# Patient Record
Sex: FEMALE
[De-identification: names, ages, dates, MRNs, and addresses within clinical notes are randomized per-mention and may not be internally consistent; named-entity substitution may affect disease eponyms.]

---

## 2017-04-21 ENCOUNTER — TRANSCRIPTION ENCOUNTER (OUTPATIENT)
Age: 37
End: 2017-04-21

## 2018-02-15 ENCOUNTER — APPOINTMENT (OUTPATIENT)
Dept: PULMONOLOGY | Facility: CLINIC | Age: 38
End: 2018-02-15
Payer: COMMERCIAL

## 2018-02-15 ENCOUNTER — APPOINTMENT (OUTPATIENT)
Dept: PULMONOLOGY | Facility: CLINIC | Age: 38
End: 2018-02-15

## 2018-02-15 VITALS
HEART RATE: 88 BPM | WEIGHT: 148 LBS | DIASTOLIC BLOOD PRESSURE: 72 MMHG | OXYGEN SATURATION: 98 % | RESPIRATION RATE: 17 BRPM | BODY MASS INDEX: 23.78 KG/M2 | SYSTOLIC BLOOD PRESSURE: 100 MMHG | HEIGHT: 66 IN

## 2018-02-15 DIAGNOSIS — J06.9 ACUTE UPPER RESPIRATORY INFECTION, UNSPECIFIED: ICD-10-CM

## 2018-02-15 PROCEDURE — 71046 X-RAY EXAM CHEST 2 VIEWS: CPT

## 2018-02-15 PROCEDURE — 99204 OFFICE O/P NEW MOD 45 MIN: CPT | Mod: 25

## 2018-02-15 PROCEDURE — 94640 AIRWAY INHALATION TREATMENT: CPT

## 2018-02-15 PROCEDURE — 96372 THER/PROPH/DIAG INJ SC/IM: CPT

## 2018-02-15 RX ORDER — METHYLPRED ACET/NACL,ISO-OS/PF 40 MG/ML
40 VIAL (ML) INJECTION
Qty: 1 | Refills: 0 | Status: COMPLETED | OUTPATIENT
Start: 2018-02-15

## 2018-02-15 RX ADMIN — METHYLPREDNISOLONE ACETATE 0 MG/ML: 40 INJECTION, SUSPENSION INTRA-ARTICULAR; INTRALESIONAL; INTRAMUSCULAR; SOFT TISSUE at 00:00

## 2018-02-19 ENCOUNTER — APPOINTMENT (OUTPATIENT)
Dept: PULMONOLOGY | Facility: CLINIC | Age: 38
End: 2018-02-19
Payer: COMMERCIAL

## 2018-02-19 VITALS
BODY MASS INDEX: 23.78 KG/M2 | HEIGHT: 66 IN | DIASTOLIC BLOOD PRESSURE: 70 MMHG | RESPIRATION RATE: 17 BRPM | OXYGEN SATURATION: 99 % | SYSTOLIC BLOOD PRESSURE: 106 MMHG | WEIGHT: 148 LBS | HEART RATE: 82 BPM

## 2018-02-19 DIAGNOSIS — J98.01 ACUTE BRONCHOSPASM: ICD-10-CM

## 2018-02-19 PROCEDURE — 99214 OFFICE O/P EST MOD 30 MIN: CPT

## 2018-02-19 RX ORDER — ALBUTEROL SULFATE 90 UG/1
108 (90 BASE) AEROSOL, METERED RESPIRATORY (INHALATION)
Qty: 1 | Refills: 1 | Status: ACTIVE | OUTPATIENT
Start: 2018-02-19

## 2018-02-27 ENCOUNTER — APPOINTMENT (OUTPATIENT)
Dept: PULMONOLOGY | Facility: CLINIC | Age: 38
End: 2018-02-27
Payer: COMMERCIAL

## 2018-02-27 VITALS
OXYGEN SATURATION: 98 % | DIASTOLIC BLOOD PRESSURE: 80 MMHG | BODY MASS INDEX: 23.78 KG/M2 | WEIGHT: 148 LBS | HEART RATE: 80 BPM | HEIGHT: 66 IN | SYSTOLIC BLOOD PRESSURE: 124 MMHG

## 2018-02-27 DIAGNOSIS — J45.20 MILD INTERMITTENT ASTHMA, UNCOMPLICATED: ICD-10-CM

## 2018-02-27 PROCEDURE — 94729 DIFFUSING CAPACITY: CPT

## 2018-02-27 PROCEDURE — 94727 GAS DIL/WSHOT DETER LNG VOL: CPT

## 2018-02-27 PROCEDURE — 94060 EVALUATION OF WHEEZING: CPT

## 2018-02-27 PROCEDURE — 99214 OFFICE O/P EST MOD 30 MIN: CPT | Mod: 25

## 2018-02-27 RX ORDER — IPRATROPIUM BROMIDE AND ALBUTEROL SULFATE 2.5; .5 MG/3ML; MG/3ML
0.5-2.5 (3) SOLUTION RESPIRATORY (INHALATION)
Qty: 1 | Refills: 1 | Status: DISCONTINUED | COMMUNITY
Start: 2018-02-15 | End: 2018-02-27

## 2018-02-27 RX ORDER — PREDNISONE 20 MG/1
20 TABLET ORAL DAILY
Qty: 14 | Refills: 0 | Status: COMPLETED | COMMUNITY
Start: 2018-02-15 | End: 2018-02-27

## 2018-02-27 RX ORDER — BUDESONIDE 0.5 MG/2ML
0.5 INHALANT ORAL
Qty: 1 | Refills: 2 | Status: DISCONTINUED | COMMUNITY
Start: 2018-02-15 | End: 2018-02-27

## 2018-02-27 RX ORDER — ALBUTEROL SULFATE 90 UG/1
108 (90 BASE) AEROSOL, METERED RESPIRATORY (INHALATION)
Qty: 1 | Refills: 1 | Status: ACTIVE | COMMUNITY
Start: 2018-02-27

## 2018-02-27 RX ORDER — FLUTICASONE FUROATE AND VILANTEROL TRIFENATATE 100; 25 UG/1; UG/1
100-25 POWDER RESPIRATORY (INHALATION) DAILY
Qty: 1 | Refills: 0 | Status: DISCONTINUED | OUTPATIENT
Start: 2018-02-19 | End: 2018-02-27

## 2018-03-29 ENCOUNTER — TRANSCRIPTION ENCOUNTER (OUTPATIENT)
Age: 38
End: 2018-03-29

## 2020-12-16 PROBLEM — J06.9 UPPER RESPIRATORY INFECTION, ACUTE: Status: RESOLVED | Noted: 2018-02-15 | Resolved: 2020-12-16

## 2024-03-18 ENCOUNTER — APPOINTMENT (OUTPATIENT)
Dept: OBGYN | Facility: CLINIC | Age: 44
End: 2024-03-18
Payer: COMMERCIAL

## 2024-03-18 PROCEDURE — 36415 COLL VENOUS BLD VENIPUNCTURE: CPT

## 2024-03-18 PROCEDURE — 99213 OFFICE O/P EST LOW 20 MIN: CPT | Mod: 25

## 2024-03-18 PROCEDURE — 99396 PREV VISIT EST AGE 40-64: CPT

## 2024-04-02 ENCOUNTER — TRANSCRIPTION ENCOUNTER (OUTPATIENT)
Age: 44
End: 2024-04-02

## 2024-04-02 ENCOUNTER — APPOINTMENT (OUTPATIENT)
Dept: OBGYN | Facility: CLINIC | Age: 44
End: 2024-04-02
Payer: COMMERCIAL

## 2024-04-02 PROCEDURE — 76830 TRANSVAGINAL US NON-OB: CPT

## 2024-09-04 ENCOUNTER — APPOINTMENT (OUTPATIENT)
Dept: PULMONOLOGY | Facility: CLINIC | Age: 44
End: 2024-09-04
Payer: COMMERCIAL

## 2024-09-04 VITALS
BODY MASS INDEX: 23.78 KG/M2 | RESPIRATION RATE: 17 BRPM | WEIGHT: 148 LBS | SYSTOLIC BLOOD PRESSURE: 110 MMHG | OXYGEN SATURATION: 99 % | HEART RATE: 96 BPM | DIASTOLIC BLOOD PRESSURE: 72 MMHG | TEMPERATURE: 97.3 F | HEIGHT: 66 IN

## 2024-09-04 DIAGNOSIS — Z83.3 FAMILY HISTORY OF DIABETES MELLITUS: ICD-10-CM

## 2024-09-04 DIAGNOSIS — Z80.8 FAMILY HISTORY OF MALIGNANT NEOPLASM OF OTHER ORGANS OR SYSTEMS: ICD-10-CM

## 2024-09-04 DIAGNOSIS — Z82.5 FAMILY HISTORY OF ASTHMA AND OTHER CHRONIC LOWER RESPIRATORY DISEASES: ICD-10-CM

## 2024-09-04 DIAGNOSIS — R05.9 COUGH, UNSPECIFIED: ICD-10-CM

## 2024-09-04 DIAGNOSIS — J45.909 UNSPECIFIED ASTHMA, UNCOMPLICATED: ICD-10-CM

## 2024-09-04 PROCEDURE — 94729 DIFFUSING CAPACITY: CPT

## 2024-09-04 PROCEDURE — 71046 X-RAY EXAM CHEST 2 VIEWS: CPT

## 2024-09-04 PROCEDURE — 94727 GAS DIL/WSHOT DETER LNG VOL: CPT

## 2024-09-04 PROCEDURE — ZZZZZ: CPT

## 2024-09-04 PROCEDURE — 99204 OFFICE O/P NEW MOD 45 MIN: CPT | Mod: 25

## 2024-09-04 PROCEDURE — 95012 NITRIC OXIDE EXP GAS DETER: CPT

## 2024-09-04 PROCEDURE — 94060 EVALUATION OF WHEEZING: CPT

## 2024-09-04 RX ORDER — LEVOTHYROXINE SODIUM 0.17 MG/1
TABLET ORAL
Refills: 0 | Status: ACTIVE | COMMUNITY

## 2024-09-04 RX ORDER — LORAZEPAM 2 MG/1
TABLET ORAL
Refills: 0 | Status: ACTIVE | COMMUNITY

## 2024-09-04 RX ORDER — ALBUTEROL SULFATE 90 UG/1
108 (90 BASE) INHALANT RESPIRATORY (INHALATION)
Qty: 1 | Refills: 0 | Status: ACTIVE | COMMUNITY
Start: 2024-09-04 | End: 1900-01-01

## 2024-09-04 RX ORDER — SEMAGLUTIDE 1.7 MG/.75ML
INJECTION, SOLUTION SUBCUTANEOUS
Refills: 0 | Status: ACTIVE | COMMUNITY

## 2024-09-04 RX ORDER — OMEPRAZOLE 40 MG/1
40 CAPSULE, DELAYED RELEASE ORAL
Qty: 1 | Refills: 0 | Status: ACTIVE | COMMUNITY
Start: 2024-09-04 | End: 1900-01-01

## 2024-09-04 RX ORDER — BUPROPION HYDROCHLORIDE 100 MG/1
TABLET, FILM COATED ORAL
Refills: 0 | Status: ACTIVE | COMMUNITY

## 2024-09-04 NOTE — ASSESSMENT
[FreeTextEntry1] : Ms. SALAS is a 44-year-old, nonsmoking, female. She has past medical history of Covid-19 infection 12/2021, Hypothyroidism - Hashimoto's, Seasonal Allergies, & Anxiety/Depression. She presents for an initial visit. Her chief concern is intermittent "nagging nighttime cough" x 4 months.   1. Cough: - likely r/t GERD: see plan below.  - likely r/t RADs (based on subjective history, PFTs with WYO03-10% of 75% and 19% BDR): see plan below.  - CXR WNL.   2. RADs: - Add Albuterol HFA 2 puffs Q6H PRN.  3. GERD: - Add Omeprazole 40 mg POI Q AM x 1 month. Patient has had benefit with PPI use in the past for similar cough.  Patient to follow up with NP in 4 weeks via video.  Patient to call with further questions and concerns. Patient verbalizes understanding of care plan and is agreeable.

## 2024-09-04 NOTE — PROCEDURE
[FreeTextEntry1] : Pulmonary testing performed in office today because of cough.   PRE-BRONCHODILATOR                         POST-BRONCHODILATOR      - FEV1: 93%                                            - FEV1: 98% - FEV1/FVC Ratio: 92%                          - FEV1/FVC Ratio: 95% - OWG15-96%: 75%                                 - BDM71-65%: 89% (19% BDR) DLCO: 77%  Feno: 23 ppb; low level.   CXR in office; Read by Dr. Sheehan.  Impression: Normal appearing heart. No infiltrates, effusion or opacities noted.

## 2024-09-04 NOTE — HISTORY OF PRESENT ILLNESS
[TextBox_4] : Ms. SALAS is a 44-year-old, nonsmoking, female. She has past medical history of Covid-19 infection 12/2021, Hypothyroidism - Hashimoto's, Seasonal Allergies, & Anxiety/Depression. She presents for an initial visit.  Her chief concern is intermittent "nagging nighttime dry cough" x 4 months.   She states PCP advised her to initiate Famotidine, which she tried last week but did not take consistently. She states trying Prilosec last summer for a nighttime cough with great benefit.   She states long standing history of cough. She states usually twice per year she has a sinus infection that turns into residual cough.  She was last on inhaler therapy last a few years ago (she was seen by Dr. Fish in the past - last 2018).  She states she was never on maintenance inhaler therapy. She was RX'ed nebulizer and Duoneb and Budesonide solution by Dr. Villela in the past.  She admits to childhood sports induced bronchospasm where she carried around an inhaler on the field.   She takes Ativan nightly for sleep. She states she started it during Covid pandemic and continues QHS. She initiates sleep at 11 PM and awakens at 7 AM. She admits to feeling well rested upon awakening.   She denies productive cough.  Patient denies fever/chills, decreased appetite, increased fatigue, wheezing, shortness of breath at rest or exertion, or chest tightness at this time. She denies snoring, EDS, or nonrestorative sleep.

## 2024-09-04 NOTE — DISCUSSION/SUMMARY
[FreeTextEntry1] : Reviewed PFT results from 2018 performed by Dr. Fish and compared to today's results.

## 2024-09-19 ENCOUNTER — APPOINTMENT (OUTPATIENT)
Dept: OBGYN | Facility: CLINIC | Age: 44
End: 2024-09-19
Payer: COMMERCIAL

## 2024-09-19 PROCEDURE — 99213 OFFICE O/P EST LOW 20 MIN: CPT

## 2024-09-26 ENCOUNTER — RX RENEWAL (OUTPATIENT)
Age: 44
End: 2024-09-26

## 2024-10-02 ENCOUNTER — APPOINTMENT (OUTPATIENT)
Dept: PULMONOLOGY | Facility: CLINIC | Age: 44
End: 2024-10-02
Payer: COMMERCIAL

## 2024-10-02 DIAGNOSIS — J45.909 UNSPECIFIED ASTHMA, UNCOMPLICATED: ICD-10-CM

## 2024-10-02 DIAGNOSIS — K21.9 GASTRO-ESOPHAGEAL REFLUX DISEASE W/OUT ESOPHAGITIS: ICD-10-CM

## 2024-10-02 DIAGNOSIS — R05.9 COUGH, UNSPECIFIED: ICD-10-CM

## 2024-10-02 PROCEDURE — 99213 OFFICE O/P EST LOW 20 MIN: CPT

## 2024-10-02 NOTE — REASON FOR VISIT
[Home] : at home, [unfilled] , at the time of the visit. [Medical Office: ([facility])___] : at the medical office located in  [Follow-Up] : a follow-up visit [Cough] : cough

## 2024-10-02 NOTE — ASSESSMENT
[FreeTextEntry1] : Ms. SALAS is a 44-year-old, nonsmoking, female. She has past medical history of Covid-19 infection 12/2021, Hypothyroidism - Hashimoto's, Seasonal Allergies, & Anxiety/Depression. She presents for a follow up visit. Her chief concern is intermittent "nagging nighttime cough" x 4 months.   1. Cough; improved with use of PPI.  - likely r/t GERD: see plan below.  - less likely r/t RADs (based on subjective history, PFTs with OWP10-10% of 75% and 19% BDR): see plan below.  - CXR WNL.   2. RADs: - Continue Albuterol HFA 2 puffs Q6H PRN.  3. GERD: - Continue Omeprazole 40 mg PO Q AM x 1 more month. Advised if cough persists to establish care with GI for possible endoscopy.  - Dietary changes   Patient to follow up with NP in 4 weeks via video.  Patient to call with further questions and concerns. Patient verbalizes understanding of care plan and is agreeable. ovarian mass with pain

## 2024-10-02 NOTE — REVIEW OF SYSTEMS
[Cough] : cough [Chest Tightness] : no chest tightness [Sputum] : no sputum [Dyspnea] : no dyspnea [Wheezing] : no wheezing [SOB on Exertion] : no sob on exertion [Negative] : Endocrine

## 2024-10-02 NOTE — HISTORY OF PRESENT ILLNESS
[TextBox_4] : Ms. SALAS is a 44-year-old, nonsmoking, female. She has past medical history of Covid-19 infection 12/2021, Hypothyroidism - Hashimoto's, Seasonal Allergies, & Anxiety/Depression. She presents for a follow up visit.   Her chief concern is intermittent "nagging nighttime dry cough" x 4 months.   9/4/2024 NPA VISIT: She states PCP advised her to initiate Famotidine, which she tried last week but did not take consistently. She states trying Prilosec last summer for a nighttime cough with great benefit.   She states long standing history of cough. She states usually twice per year she has a sinus infection that turns into residual cough.  She was last on inhaler therapy last a few years ago (she was seen by Dr. Fish in the past - last 2018).  She states she was never on maintenance inhaler therapy. She was RX'ed nebulizer and Duoneb and Budesonide solution by Dr. Villela in the past.  She admits to childhood sports induced bronchospasm where she carried around an inhaler on the field.   She takes Ativan nightly for sleep. She states she started it during Covid pandemic and continues QHS. She initiates sleep at 11 PM and awakens at 7 AM. She admits to feeling well rested upon awakening.   10/2/2024 TODAY'S VIDEO FOLLOW UP VISIT:  Patient states symptoms greatly improved with use of Prilosec.  She endorses mild/intermittent residual nighttime cough.  She states she has not needed Albuterol.   She denies productive cough.  Patient denies fever/chills, decreased appetite, increased fatigue, wheezing, shortness of breath at rest or exertion, or chest tightness at this time. She denies snoring, EDS, or nonrestorative sleep.

## 2024-10-05 ENCOUNTER — NON-APPOINTMENT (OUTPATIENT)
Age: 44
End: 2024-10-05

## 2025-04-15 ENCOUNTER — NON-APPOINTMENT (OUTPATIENT)
Age: 45
End: 2025-04-15

## 2025-09-10 ENCOUNTER — APPOINTMENT (OUTPATIENT)
Dept: OBGYN | Facility: CLINIC | Age: 45
End: 2025-09-10
Payer: COMMERCIAL

## 2025-09-10 PROCEDURE — 36415 COLL VENOUS BLD VENIPUNCTURE: CPT

## 2025-09-10 PROCEDURE — 99396 PREV VISIT EST AGE 40-64: CPT | Mod: 25

## 2025-09-10 PROCEDURE — 96127 BRIEF EMOTIONAL/BEHAV ASSMT: CPT

## 2025-09-10 PROCEDURE — 57500 BIOPSY OF CERVIX: CPT
